# Patient Record
Sex: FEMALE | URBAN - METROPOLITAN AREA
[De-identification: names, ages, dates, MRNs, and addresses within clinical notes are randomized per-mention and may not be internally consistent; named-entity substitution may affect disease eponyms.]

---

## 2018-11-05 ENCOUNTER — APPOINTMENT (RX ONLY)
Dept: URBAN - METROPOLITAN AREA CLINIC 15 | Facility: CLINIC | Age: 58
Setting detail: DERMATOLOGY
End: 2018-11-05

## 2018-11-05 DIAGNOSIS — Z41.9 ENCOUNTER FOR PROCEDURE FOR PURPOSES OTHER THAN REMEDYING HEALTH STATE, UNSPECIFIED: ICD-10-CM

## 2018-11-05 PROBLEM — E78.5 HYPERLIPIDEMIA, UNSPECIFIED: Status: ACTIVE | Noted: 2018-11-05

## 2018-11-05 PROCEDURE — ? FILLERS

## 2018-11-05 PROCEDURE — ? COSMETIC CONSULTATION: FILLERS

## 2018-11-05 PROCEDURE — ? ADDITIONAL NOTES

## 2018-11-05 ASSESSMENT — LOCATION SIMPLE DESCRIPTION DERM
LOCATION SIMPLE: LEFT LIP
LOCATION SIMPLE: RIGHT LIP

## 2018-11-05 ASSESSMENT — LOCATION ZONE DERM: LOCATION ZONE: LIP

## 2018-11-05 ASSESSMENT — LOCATION DETAILED DESCRIPTION DERM
LOCATION DETAILED: RIGHT UPPER CUTANEOUS LIP
LOCATION DETAILED: LEFT UPPER CUTANEOUS LIP

## 2018-11-05 NOTE — PROCEDURE: FILLERS
Tear Troughs Filler  Volume In Cc: 0
Include Cannula Information In Note?: No
Additional Area 1 Location: corners of the mouth
Additional Area 1 Volume In Cc: 0.8
Include Cannula Size?: 27G
Lot #: [id number]
Include Cannula Information In Note?: Yes
Nasolabial Folds Filler Volume In Cc: 0.4
Filler: Juvederm Ultra
Include Cannula Brand?: DermaSculpt
Additional Area 1 Location: Corners of the mouth and lips
Post-Care Instructions: Patient instructed to apply ice to reduce swelling.
Additional Area 2 Location: left eyebrowâs tail
Expiration Date (Month Year): 2019/05
Expiration Date (Month Year): 2019/11
Expiration Date (Month Year): 2019/03
Consent: Written consent obtained. Risks include but not limited to bruising, beading, irregular texture, ulceration, infection, allergic reaction, scar formation, incomplete augmentation, temporary nature, procedural pain.
Expiration Date (Month Year): 2019/10
Lot #: [id number]
Lot #: [id number]
Anesthesia Volume In Cc: 0.5
Additional Anesthesia Volume In Cc: 6
Map Statment: See Attached Map for Complete Details
Detail Level: Detailed
Topical Anesthesia?: BLT cream (benzocaine 20%, lidocaine 6%, tetracaine 4%)
Filler: Belotero
Lot #: [id number]
Lot #: [id number]
Additional Area 3 Location: frown area wrinkle.